# Patient Record
Sex: FEMALE | Race: WHITE
[De-identification: names, ages, dates, MRNs, and addresses within clinical notes are randomized per-mention and may not be internally consistent; named-entity substitution may affect disease eponyms.]

---

## 2022-01-18 ENCOUNTER — HOSPITAL ENCOUNTER (OUTPATIENT)
Dept: HOSPITAL 75 - PREOP | Age: 37
Discharge: HOME | End: 2022-01-18
Attending: OBSTETRICS & GYNECOLOGY
Payer: COMMERCIAL

## 2022-01-18 VITALS — BODY MASS INDEX: 43.86 KG/M2 | HEIGHT: 62.01 IN | WEIGHT: 241.41 LBS

## 2022-01-18 DIAGNOSIS — Z01.818: Primary | ICD-10-CM

## 2022-01-25 ENCOUNTER — HOSPITAL ENCOUNTER (OUTPATIENT)
Dept: HOSPITAL 75 - SDC | Age: 37
Discharge: HOME | End: 2022-01-25
Attending: OBSTETRICS & GYNECOLOGY
Payer: COMMERCIAL

## 2022-01-25 VITALS — HEIGHT: 62.01 IN | BODY MASS INDEX: 43.86 KG/M2 | WEIGHT: 241.41 LBS

## 2022-01-25 VITALS — SYSTOLIC BLOOD PRESSURE: 115 MMHG | DIASTOLIC BLOOD PRESSURE: 66 MMHG

## 2022-01-25 VITALS — DIASTOLIC BLOOD PRESSURE: 87 MMHG | SYSTOLIC BLOOD PRESSURE: 119 MMHG

## 2022-01-25 VITALS — SYSTOLIC BLOOD PRESSURE: 118 MMHG | DIASTOLIC BLOOD PRESSURE: 69 MMHG

## 2022-01-25 VITALS — DIASTOLIC BLOOD PRESSURE: 87 MMHG | SYSTOLIC BLOOD PRESSURE: 116 MMHG

## 2022-01-25 VITALS — DIASTOLIC BLOOD PRESSURE: 71 MMHG | SYSTOLIC BLOOD PRESSURE: 122 MMHG

## 2022-01-25 VITALS — SYSTOLIC BLOOD PRESSURE: 109 MMHG | DIASTOLIC BLOOD PRESSURE: 61 MMHG

## 2022-01-25 VITALS — DIASTOLIC BLOOD PRESSURE: 67 MMHG | SYSTOLIC BLOOD PRESSURE: 111 MMHG

## 2022-01-25 VITALS — SYSTOLIC BLOOD PRESSURE: 118 MMHG | DIASTOLIC BLOOD PRESSURE: 68 MMHG

## 2022-01-25 VITALS — SYSTOLIC BLOOD PRESSURE: 108 MMHG | DIASTOLIC BLOOD PRESSURE: 63 MMHG

## 2022-01-25 VITALS — SYSTOLIC BLOOD PRESSURE: 122 MMHG | DIASTOLIC BLOOD PRESSURE: 70 MMHG

## 2022-01-25 DIAGNOSIS — E66.01: ICD-10-CM

## 2022-01-25 DIAGNOSIS — N83.8: ICD-10-CM

## 2022-01-25 DIAGNOSIS — K66.0: ICD-10-CM

## 2022-01-25 DIAGNOSIS — Z79.899: ICD-10-CM

## 2022-01-25 DIAGNOSIS — N88.8: ICD-10-CM

## 2022-01-25 DIAGNOSIS — D06.7: ICD-10-CM

## 2022-01-25 DIAGNOSIS — Z87.891: ICD-10-CM

## 2022-01-25 DIAGNOSIS — D25.2: Primary | ICD-10-CM

## 2022-01-25 DIAGNOSIS — J40: ICD-10-CM

## 2022-01-25 LAB
APTT PPP: YELLOW S
BACTERIA #/AREA URNS HPF: (no result) /HPF
BASOPHILS # BLD AUTO: 0 10^3/UL (ref 0–0.1)
BASOPHILS NFR BLD AUTO: 1 % (ref 0–10)
BILIRUB UR QL STRIP: (no result)
EOSINOPHIL # BLD AUTO: 0.1 10^3/UL (ref 0–0.3)
EOSINOPHIL NFR BLD AUTO: 1 % (ref 0–10)
FIBRINOGEN PPP-MCNC: (no result) MG/DL
GLUCOSE UR STRIP-MCNC: NEGATIVE MG/DL
HCT VFR BLD CALC: 44 % (ref 35–52)
HGB BLD-MCNC: 14.4 G/DL (ref 11.5–16)
KETONES UR QL STRIP: (no result)
LEUKOCYTE ESTERASE UR QL STRIP: NEGATIVE
LYMPHOCYTES # BLD AUTO: 2 10^3/UL (ref 1–4)
LYMPHOCYTES NFR BLD AUTO: 25 % (ref 12–44)
MANUAL DIFFERENTIAL PERFORMED BLD QL: NO
MCH RBC QN AUTO: 28 PG (ref 25–34)
MCHC RBC AUTO-ENTMCNC: 33 G/DL (ref 32–36)
MCV RBC AUTO: 85 FL (ref 80–99)
MONOCYTES # BLD AUTO: 0.7 10^3/UL (ref 0–1)
MONOCYTES NFR BLD AUTO: 9 % (ref 0–12)
NEUTROPHILS # BLD AUTO: 5.1 10^3/UL (ref 1.8–7.8)
NEUTROPHILS NFR BLD AUTO: 64 % (ref 42–75)
NITRITE UR QL STRIP: NEGATIVE
PH UR STRIP: 6 [PH] (ref 5–9)
PLATELET # BLD: 313 10^3/UL (ref 130–400)
PMV BLD AUTO: 9.2 FL (ref 9–12.2)
PROT UR QL STRIP: (no result)
RBC #/AREA URNS HPF: (no result) /HPF
SP GR UR STRIP: >=1.03 (ref 1.02–1.02)
WBC # BLD AUTO: 7.9 10^3/UL (ref 4.3–11)
WBC #/AREA URNS HPF: (no result) /HPF

## 2022-01-25 PROCEDURE — 94664 DEMO&/EVAL PT USE INHALER: CPT

## 2022-01-25 PROCEDURE — 85025 COMPLETE CBC W/AUTO DIFF WBC: CPT

## 2022-01-25 PROCEDURE — 87088 URINE BACTERIA CULTURE: CPT

## 2022-01-25 PROCEDURE — 86900 BLOOD TYPING SEROLOGIC ABO: CPT

## 2022-01-25 PROCEDURE — 81000 URINALYSIS NONAUTO W/SCOPE: CPT

## 2022-01-25 PROCEDURE — 88309 TISSUE EXAM BY PATHOLOGIST: CPT

## 2022-01-25 PROCEDURE — 87081 CULTURE SCREEN ONLY: CPT

## 2022-01-25 PROCEDURE — 36415 COLL VENOUS BLD VENIPUNCTURE: CPT

## 2022-01-25 PROCEDURE — 86901 BLOOD TYPING SEROLOGIC RH(D): CPT

## 2022-01-25 PROCEDURE — 84703 CHORIONIC GONADOTROPIN ASSAY: CPT

## 2022-01-25 PROCEDURE — 86850 RBC ANTIBODY SCREEN: CPT

## 2022-01-25 RX ADMIN — SODIUM CHLORIDE, SODIUM LACTATE, POTASSIUM CHLORIDE, AND CALCIUM CHLORIDE PRN MLS/HR: 600; 310; 30; 20 INJECTION, SOLUTION INTRAVENOUS at 07:56

## 2022-01-25 RX ADMIN — SODIUM CHLORIDE, SODIUM LACTATE, POTASSIUM CHLORIDE, AND CALCIUM CHLORIDE PRN MLS/HR: 600; 310; 30; 20 INJECTION, SOLUTION INTRAVENOUS at 08:55

## 2022-01-25 RX ADMIN — KETOROLAC TROMETHAMINE SCH MG: 30 INJECTION, SOLUTION INTRAMUSCULAR; INTRAVENOUS at 10:29

## 2022-01-25 RX ADMIN — KETOROLAC TROMETHAMINE SCH MG: 30 INJECTION, SOLUTION INTRAMUSCULAR; INTRAVENOUS at 16:53

## 2022-01-25 NOTE — OPERATIVE REPORT
Operative Report


Date of Procedure/Surgery


Jan 25, 2022


Surgeon (s)


EDIN REYES DO


Assistant (s):  NA





Post-Operative Diagnosis





cervical dysplasia


paratubal adhesions





Procedure Performed





RaTH, bilateral salpingectomy, lysis of adhesions





Description of Procedure


Anesthesia Type:  General


Estimated blood loss (mL):  minimal


Specimen(s) collected/removed


uterus, tubes and ovaries


Description of the Procedure


After informed consent was obtained, patient was taken into the operating room 

where general anesthetic was found to be adequate.  She was prepped and draped 

in the usual sterile fashion in the dorsal lithotomy position.  





A Yeung catheter was placed.  A speculum was placed in the vagina.   The cervix 

was visualized and the anterior lip was grasped with a sharp toothed tenaculum. 

The uterus was  sounded to a depth was approximately  8 centimeters.  I placed 

the Lydia device (8 cm) and a 3.0 cm collar was advanced over the cervix.  I 

inserted the Lydia  without difficulty, inflating the balloon and securing it 

around the fornix of the cervix.  The collar was then secured with sutures at 12

o'clock.  





Attention was then turned to the patient's abdomen.  The skin was injected with 

0.1% lidocaine with epinephrine.  A supraumbilical incision was made about 8 mm 

in length.  A Veress needle was inserted and I  confirmed intraabdominal 

placement with a drop in pressure and the saline drop test.  The opening 

pressure was 6 mmHg.  I then insufflated the abdomen to a maximum of 15 mmHg 

with warmed CO2 gas.   I placed an additional 8 mm trocar in the left abdomen 

lateral to the umbilicus approximately 15 cm lateral.   





The second  and third robotic port was placed about 12 cm lateral to the right 

and left of the umbilical placement. 0.1% lidocaine with epinephrine was 

injected prior to placement of all trocars.  These were 8 mm trocars.  These 

were placed under direct visualization of the laparoscope.     When all 

placements were confirmed, the patient was placed in steep Trendelenburg 

allowing adequate visualization.  The robot was brought in for docking.  The 

docking was accomplished without difficulty.  It was determined that the 

procedure could be done robotically.





 I then took over the command of the robot utilizing the synchroseal and 

monopolar bryce.  I visualized the round ligaments bilaterally and grasped them

and cauterized with bipolar cautery and then cut with my bryce.   There were 

adhesions of the left tube and ovary and the left side wall and these were taken

down with the bryce before I could visualize the tube adequately enough to 

perform the salpingectomy.  At this point, I then did bilateral salpingectomy.  

I incised the mesosalpinx with the bryce. I then grasped the uterine ovarian 

ligaments bilaterally with the synchroseal and then excised with the monopolar 

bryce.





I then moved my dissection to the posterior leaves of the broad ligament.  I 

dissected the posterior leaves of the broad ligament off the uterine arteries 

skeletonizing them bilaterally.  I then took a second clamp with the synchroseal

and with the bryce, transected the vessels away from the lateral aspect to the 

cervical stroma.   I dissected the anterior peritoneum off the lower uterine 

segment.  I continually pushed the bladder back and  I took excessively great 

care and I was eventually able to dissect the vesicouterine peritoneum off the 

lower uterine segment.





I then dissected in a V fashion towards the midline between the uterosacral 

ligaments.  This allowed me to skeletonize the uterine vessels bilaterally.    

The balloon on the LYDIA was insufflated.  This allowed me to see the LYDIA 

circumferentially.  I then performed a colpotomy anteriorly and  then amputate 

with cervix away from the vaginal fornix.  I then continued the colpotomy 

circumferentially.  Once this was performed, the assistant removed the uterus 

through the vagina. The assistant then left  a sponge in the vagina to maintain 

the pneumoperitoneum.  .  





I then began closure of the vaginal cuff.  I closed the apices of the vaginal 

cuff with 2-0 Vicryl V lock sutures with a colposuspension through the uterosac

ral ligaments.  This suspended the apices of the vaginal cuff.   I extended this

to the midline from both sides and overlapped the V lock sutures in the midline.

  Excellent closure is noted and hemostasis is achieved.





All the needles were removed from the patient's abdomen.  





Now, the robotic instruments were removed and the robot was docked back to 

laparoscopy.  The pelvis was irrigated.  There was no active bleeding noted.  





Bilateral ureters were seen the entire time during the surgery and were peris

talsing.  There was no excessive bleeding noted. 





The trocars were removed under direct visualization.  The laparoscopic sites 

were visualized and found to be hemostatic.   The skin incisions were closed 

with 4-0 Monocryl in a subcuticular fashion and then with Skin Affix.  Op sites 

were placed over the incision sites.  The instruments were removed from the 

vagina and I noted there were no abrasions.





Sponge, lap, needle and instrument counts correct times two.  Patient was 

awakened and taken to recovery in a stable condition


Findings of the Procedure


slightly enlarged, boggy uterus


tiny submucosal fibroids


adhesions of ovary to tube on left and tube to sidewall





Allergies and Home Medications


Allergies


Coded Allergies:  


     acetaminophen (Verified  Allergy, Unknown, Anaphylaxis, 1/18/22)


     aspirin (Verified  Allergy, Unknown, Anaphylaxis, 1/18/22)


     bupropion (Verified  Allergy, Unknown, "paranoid", 1/18/22)


     caffeine (Verified  Allergy, Unknown, Anaphylaxis, 1/18/22)





Patient Home Medication List


Home Medication List Reviewed:  Yes


Acetaminophen (Acetaminophen) 500 Mg Tablet, 500 MG PO Q8H


   Prescribed by: EDIN REYES on 1/25/22 1024


Cholecalciferol (Vitamin D3) (Vitamin D3) 25 Mcg Tablet, 25 MCG PO DAILY, (Re

ported)


   Entered as Reported by: LACY ARAUJO on 1/18/22 1020


   Last Action: Last Taken Edited


Fexofenadine HCl (Allegra Allergy) 180 Mg Tablet, 180 MG PO DAILY, (Reported)


   Entered as Reported by: LACY ARAUJO on 1/18/22 1020


   Last Action: Last Taken Edited


Fluticasone Propionate (Flonase Allergy Relief) 9.9 Ml [place].susp, 1 SPRAY NS 

BID, (Reported)


   Entered as Reported by: LAMONTE WHYTE on 1/25/22 0819


   Last Action: Last Taken Edited


Ibuprofen (Ibu) 600 Mg Tablet, 600 MG PO Q6HR


   Prescribed by: EDIN REYES on 1/25/22 1024


Ondansetron (Ondansetron Odt) 4 Mg Tab.rapdis, 4 MG PO Q6H PRN for 

NAUSEA/VOMITING


   Prescribed by: EDIN REYES on 1/25/22 1024


Oxycodone Hcl (Oxyir Tablet) 5 Mg Tab, 5 MG PO Q6H PRN for PAIN-SEE DOSE 

INSTRUCTIONS


   Prescribed by: JESSIE CARR on 1/25/22 1642


Pnv with Ca,No.74/Iron/FA (Prenatal Low Iron Tablet) 1 Each Tablet, 1 EACH PO 

DAILY, (Reported)


   Entered as Reported by: LAMONTE WHYTE on 1/25/22 0819


   Last Action: Last Taken Edited


Vitamin B Complex (Vitamin B Complex) 1 Each Tablet, 1 EACH PO DAILY, (Reported)


   Entered as Reported by: LACY ARAUJO on 1/18/22 1020


   Last Action: Last Taken Edited


Discontinued Medications


Albuterol Sulfate (Proair Hfa) 1 Puff Puff, 2 PUFF IH Q4H PRN for WHEEZING, 

(Reported)


   Discontinued Reason: No Longer Taking


   Entered as Reported by: LACY ARAUJO on 1/18/22 1020


   Last Action: Discontinued











EDIN REYES DO               Jan 25, 2022 10:12

## 2022-01-25 NOTE — DISCHARGE INST-WOMEN'S SERVICE
Discharge Inst-Women's Serv


Depart Medication/Instructions


New, Converted or Re-Newed RX:  Transmitted to Pharmacy


Instructions


nothing in the vagina until cleared by physician


no lifting over 25 lbs


no driving for 1 week


Final Diagnosis


cervical dysplasia


uterine fibroids


enteropelvic adhesions


Problems Reviewed?:  Yes





Consults/Follow Up


Additional Follow Up:  Yes (1 week for incision check and 8 week for post 

operative exam)





Activity


Activity:  Activity as Tolerated


Driving Instructions:  No Driving for 1 Week


NO SMOKING:  NO SMOKING


Nothing Inside Vagina:  No Douching, No [place], No Tampons





Diet


Discharge Diet:  No Restrictions


Symptoms to Report to :  Swelling Increased, Bleeding Excessive, Pain 

Increased, Fever Over 101 Degrees F, Vaginal Bleeding Increase, Cramps in Feet 

or Legs, Vaginal Discharge Foul


For Any Problems or Questions:  Contact Your Physician





Skin/Wound Care


Infection Signs and Symptoms:  Increased Redness, Foul Odor of Wound, Increased 

Drainage, Skin Itchy or Has a Rash, Increased Swelling, Temperature Above 101  F


Operative Area Clean and Dry:  You May Remove Bandage (in 3 days, or if wet or 

soiled)


Stitches/Staples/Dermabond:  Dermabond


Bathing Instructions:  EDIN Tomas DO               Jan 25, 2022 10:27

## 2022-01-25 NOTE — PROGRESS NOTE-PRE OPERATIVE
Pre-Operative Progress Note


H&P Reviewed


The H&P was reviewed, patient examined and no changes noted.


Date Seen by Provider:  Jan 25, 2022


Time Seen by Provider:  07:30


Date H&P Reviewed:  Jan 25, 2022


Time H&P Reviewed:  07:30


Pre-Operative Diagnosis:  cervical dysplasia











EDIN REYES DO               Jan 25, 2022 07:44

## 2022-01-26 NOTE — ANESTHESIA-GENERAL POST-OP
General


Patient Condition


Mental Status/LOC:  Same as Preop


Cardiovascular:  Satisfactory


Nausea/Vomiting:  Absent


Respiratory:  Satisfactory


Pain:  Controlled


Complications:  Absent





Post Op Complications


Complications


None





Follow Up Care/Instructions


Patient Instructions


None needed.





Anesthesia/Patient Condition


Patient Condition


Patient is doing well, no complaints, stable vital signs, no apparent adverse 

anesthesia problems.   


No complications reported per nursing.











JACQUELINE GARCIA CRNA              Jan 26, 2022 09:21